# Patient Record
Sex: FEMALE | Race: WHITE | Employment: STUDENT | URBAN - METROPOLITAN AREA
[De-identification: names, ages, dates, MRNs, and addresses within clinical notes are randomized per-mention and may not be internally consistent; named-entity substitution may affect disease eponyms.]

---
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
## 2021-12-22 ENCOUNTER — HOSPITAL ENCOUNTER (EMERGENCY)
Age: 21
Discharge: HOME | End: 2021-12-22
Attending: EMERGENCY MEDICINE
Payer: COMMERCIAL

## 2021-12-22 VITALS
SYSTOLIC BLOOD PRESSURE: 116 MMHG | BODY MASS INDEX: 23.7 KG/M2 | HEIGHT: 69 IN | RESPIRATION RATE: 16 BRPM | WEIGHT: 160 LBS | DIASTOLIC BLOOD PRESSURE: 68 MMHG | OXYGEN SATURATION: 98 % | TEMPERATURE: 97.4 F | HEART RATE: 66 BPM

## 2021-12-22 DIAGNOSIS — H81.12 BENIGN PAROXYSMAL POSITIONAL VERTIGO OF LEFT EAR: Primary | ICD-10-CM

## 2021-12-22 LAB
ABSOLUTE EOS #: 0.1 K/UL (ref 0–0.4)
ABSOLUTE IMMATURE GRANULOCYTE: ABNORMAL K/UL (ref 0–0.3)
ABSOLUTE LYMPH #: 1.4 K/UL (ref 1.2–5.2)
ABSOLUTE MONO #: 0.5 K/UL (ref 0.1–1.4)
ANION GAP SERPL CALCULATED.3IONS-SCNC: 11 MMOL/L (ref 9–17)
BASOPHILS # BLD: 2 % (ref 0–2)
BASOPHILS ABSOLUTE: 0.1 K/UL (ref 0–0.2)
BUN BLDV-MCNC: 17 MG/DL (ref 6–20)
BUN/CREAT BLD: ABNORMAL (ref 9–20)
CALCIUM SERPL-MCNC: 9.3 MG/DL (ref 8.6–10.4)
CHLORIDE BLD-SCNC: 103 MMOL/L (ref 98–107)
CO2: 25 MMOL/L (ref 20–31)
CREAT SERPL-MCNC: 0.68 MG/DL (ref 0.5–0.9)
DIFFERENTIAL TYPE: ABNORMAL
EOSINOPHILS RELATIVE PERCENT: 1 % (ref 1–4)
GFR AFRICAN AMERICAN: >60 ML/MIN
GFR NON-AFRICAN AMERICAN: >60 ML/MIN
GFR SERPL CREATININE-BSD FRML MDRD: ABNORMAL ML/MIN/{1.73_M2}
GFR SERPL CREATININE-BSD FRML MDRD: ABNORMAL ML/MIN/{1.73_M2}
GLUCOSE BLD-MCNC: 104 MG/DL (ref 70–99)
HCG QUALITATIVE: NEGATIVE
HCT VFR BLD CALC: 39.2 % (ref 36–46)
HEMOGLOBIN: 13.3 G/DL (ref 12–16)
IMMATURE GRANULOCYTES: ABNORMAL %
LYMPHOCYTES # BLD: 22 % (ref 25–45)
MCH RBC QN AUTO: 29.7 PG (ref 26–34)
MCHC RBC AUTO-ENTMCNC: 33.9 G/DL (ref 31–37)
MCV RBC AUTO: 87.6 FL (ref 80–100)
MONOCYTES # BLD: 8 % (ref 2–8)
NRBC AUTOMATED: ABNORMAL PER 100 WBC
PDW BLD-RTO: 14 % (ref 12.5–15.4)
PLATELET # BLD: 229 K/UL (ref 140–450)
PLATELET ESTIMATE: ABNORMAL
PMV BLD AUTO: 9.5 FL (ref 6–12)
POTASSIUM SERPL-SCNC: 3.9 MMOL/L (ref 3.7–5.3)
RBC # BLD: 4.47 M/UL (ref 4–5.2)
RBC # BLD: ABNORMAL 10*6/UL
SEG NEUTROPHILS: 67 % (ref 34–64)
SEGMENTED NEUTROPHILS ABSOLUTE COUNT: 4.1 K/UL (ref 1.8–8)
SODIUM BLD-SCNC: 139 MMOL/L (ref 135–144)
WBC # BLD: 6.1 K/UL (ref 4.5–13.5)
WBC # BLD: ABNORMAL 10*3/UL

## 2021-12-22 PROCEDURE — 36415 COLL VENOUS BLD VENIPUNCTURE: CPT

## 2021-12-22 PROCEDURE — 96360 HYDRATION IV INFUSION INIT: CPT

## 2021-12-22 PROCEDURE — 84703 CHORIONIC GONADOTROPIN ASSAY: CPT

## 2021-12-22 PROCEDURE — 6370000000 HC RX 637 (ALT 250 FOR IP): Performed by: PHYSICIAN ASSISTANT

## 2021-12-22 PROCEDURE — 2580000003 HC RX 258: Performed by: PHYSICIAN ASSISTANT

## 2021-12-22 PROCEDURE — 85025 COMPLETE CBC W/AUTO DIFF WBC: CPT

## 2021-12-22 PROCEDURE — 99284 EMERGENCY DEPT VISIT MOD MDM: CPT

## 2021-12-22 PROCEDURE — 80048 BASIC METABOLIC PNL TOTAL CA: CPT

## 2021-12-22 RX ORDER — MECLIZINE HCL 12.5 MG/1
25 TABLET ORAL ONCE
Status: COMPLETED | OUTPATIENT
Start: 2021-12-22 | End: 2021-12-22

## 2021-12-22 RX ORDER — MECLIZINE HYDROCHLORIDE 25 MG/1
25 TABLET ORAL 3 TIMES DAILY PRN
Qty: 15 TABLET | Refills: 0 | Status: SHIPPED | OUTPATIENT
Start: 2021-12-22 | End: 2022-01-01

## 2021-12-22 RX ORDER — 0.9 % SODIUM CHLORIDE 0.9 %
1000 INTRAVENOUS SOLUTION INTRAVENOUS ONCE
Status: COMPLETED | OUTPATIENT
Start: 2021-12-22 | End: 2021-12-22

## 2021-12-22 RX ADMIN — MECLIZINE 25 MG: 12.5 TABLET ORAL at 11:47

## 2021-12-22 RX ADMIN — SODIUM CHLORIDE 1000 ML: 9 INJECTION, SOLUTION INTRAVENOUS at 11:47

## 2021-12-22 ASSESSMENT — ENCOUNTER SYMPTOMS
NAUSEA: 1
VOMITING: 1
EYE REDNESS: 0
SHORTNESS OF BREATH: 0
COUGH: 0
SINUS PRESSURE: 0
SINUS PAIN: 0
BACK PAIN: 0
EYE DISCHARGE: 0
PHOTOPHOBIA: 0
ABDOMINAL PAIN: 0

## 2021-12-22 NOTE — ED PROVIDER NOTES
Emergency Department           COMPLAINT       Chief Complaint   Patient presents with    Dizziness    Emesis      PHYSICAL EXAM      ED Triage Vitals [12/22/21 1115]   BP Temp Temp Source Pulse Resp SpO2 Height Weight   117/71 97.4 °F (36.3 °C) Oral 66 12 97 % 5' 9\" (1.753 m) 160 lb (72.6 kg)       Constitutional: Alert, oriented x3, nontoxic, answering questions appropriately, acting properly for age, in no acute distress   HEENT: Extraocular muscles intact, mucus membranes moist, TMs clear bilaterally, no posterior pharyngeal erythema or exudates, Pupils equal, round, reactive to light, no nystagmus no photophobia  Neck: Trachea midline no meningismus  Cardiovascular: Regular rhythm and rate no murmurs   Respiratory: Clear to auscultation bilaterally no wheezes, rhonchi, rales, no respiratory distress no tachypnea no retractions no hypoxia  Gastrointestinal: Soft, nontender, nondistended, positive bowel sounds. No rebound, rigidity, or guarding. Musculoskeletal: No extremity pain or swelling   Neurologic: Moving all 4 extremities without difficulty there are no gross focal neurologic deficits finger-to-nose normal.  Patellar tendons +1/4 bilaterally.   Skin: Warm and dry       Physical Exam  DIAGNOSTIC RESULTS     EKG: All EKG's are interpreted by the Emergency Department Physician who either signs or Co-signs this chart in the absence of a cardiologist.    Not indicated unless otherwise documented above or in the midlevel documentation    LABS:  Results for orders placed or performed during the hospital encounter of 12/22/21   CBC Auto Differential   Result Value Ref Range    WBC 6.1 4.5 - 13.5 k/uL    RBC 4.47 4.0 - 5.2 m/uL    Hemoglobin 13.3 12.0 - 16.0 g/dL    Hematocrit 39.2 36 - 46 %    MCV 87.6 80 - 100 fL    MCH 29.7 26 - 34 pg    MCHC 33.9 31 - 37 g/dL    RDW 14.0 12.5 - 15.4 %    Platelets 465 809 - 202 k/uL    MPV 9.5 6.0 - 12.0 fL    NRBC Automated NOT REPORTED per 100 WBC Differential Type NOT REPORTED     Seg Neutrophils 67 (H) 34 - 64 %    Lymphocytes 22 (L) 25 - 45 %    Monocytes 8 2 - 8 %    Eosinophils % 1 1 - 4 %    Basophils 2 0 - 2 %    Immature Granulocytes NOT REPORTED 0 %    Segs Absolute 4.10 1.8 - 8.0 k/uL    Absolute Lymph # 1.40 1.2 - 5.2 k/uL    Absolute Mono # 0.50 0.1 - 1.4 k/uL    Absolute Eos # 0.10 0.0 - 0.4 k/uL    Basophils Absolute 0.10 0.0 - 0.2 k/uL    Absolute Immature Granulocyte NOT REPORTED 0.00 - 0.30 k/uL    WBC Morphology NOT REPORTED     RBC Morphology NOT REPORTED     Platelet Estimate NOT REPORTED    Basic Metabolic Panel   Result Value Ref Range    Glucose 104 (H) 70 - 99 mg/dL    BUN 17 6 - 20 mg/dL    CREATININE 0.68 0.50 - 0.90 mg/dL    Bun/Cre Ratio NOT REPORTED 9 - 20    Calcium 9.3 8.6 - 10.4 mg/dL    Sodium 139 135 - 144 mmol/L    Potassium 3.9 3.7 - 5.3 mmol/L    Chloride 103 98 - 107 mmol/L    CO2 25 20 - 31 mmol/L    Anion Gap 11 9 - 17 mmol/L    GFR Non-African American >60 >60 mL/min    GFR African American >60 >60 mL/min    GFR Comment          GFR Staging NOT REPORTED    HCG Qualitative, Serum   Result Value Ref Range    hCG Qual NEGATIVE NEGATIVE       Not indicated unless otherwise documented above or in the midlevel documentation    RADIOLOGY:   I reviewedthe radiologist interpretations:  No orders to display       Not indicated unless otherwise documented above or in the midlevel documentation    EMERGENCY DEPARTMENT COURSE:       PERTINENT ATTENDING PHYSICIAN COMMENTS:    Vertigo. 2 episodes of the past several days with nausea. Today feels better but just worried that she is going to have another episode. No headache blurry vision or double vision. No gross focal neurologic deficits. IV fluids and Antivert. Check basic labs. 12:40 PM labs unremarkable. Feeling much better. Negative pregnancy. Suspect peripheral vertigo.     Faculty Attestation    I performed a history and physical examination of the patient and discussed management with the mid level provideer. I reviewed the mid level provider's note and agree with the documented findings and plan of care. Any areas of disagreement are noted on the chart. I was personally present for the key portions of any procedures. I have documented in the chart those procedures where I was not present during the key portions. I have reviewed the emergency nurses triage note. I agree with the chief complaint, past medical history, past surgical history, allergies, medications, social and family history as documented unless otherwise noted below. Documentation of the HPI, Physical Exam and Medical Decision Making performed by medical students or scribes is based on my personal performance of the HPI, PE and MDM. For Physician Assistant/ Nurse Practitioner cases/documentation I have personally evaluated this patient and have completed at least one if not all key elements of the E/M (history, physical exam, and MDM). Additional findings are as noted.        Tim Mccollum,   12/25/21 0030

## 2021-12-22 NOTE — ED NOTES
AVS reviewed with the patient. Discussed follow-up care, taking medications as prescribed, and when to call 911. VSS. All questions answered. IV Removed per order. Prescriptions sent to patient's pharmacy. Ambulatory out of the department with a steady, unassisted gait.         Raj Gutierrez RN  12/22/21 7554

## 2021-12-22 NOTE — ED PROVIDER NOTES
[address] [facility] ED  [address] [facility] [address]  Phone: [phone]  Fax: [phone]      Pt Name: Mercy Rene  MRN: [id number]  [place] 2000  Date of evaluation: 12/22/2021      CHIEF COMPLAINT       Chief Complaint   Patient presents with    Dizziness    Emesis       HISTORY OF PRESENT ILLNESS   (Location, Quality, Severity, Duration, Timing, Context, ModifyingFactors, Associated Signs and Symptoms)     Mercy Rene is a 22 y.o. female who presents to the ER for evaluation of dizziness. Patient states that she has noticed a small amount of dizziness over the past several days. She states that the dizziness was most significant yesterday. She ended up having 1 episode of vomiting around 7:30 PM.  Patient states that the dizziness was worse with turning her head quickly to the left. She has also noted that with walking she would veer to the left. Patient denies recent head injury. She has had no associated ear pain, sinus pressure or congestion. She has had no associated headaches. The patient's mother states that they did try to perform Epley maneuvers last night with no significant relief. Patient states that after sleeping she is feeling somewhat better today. She is not nauseous at the current time. Mother does report a history of anemia. Patient has not been able to have blood work performed due to [address]. She has not taken iron over the past year. Nursing Notes were reviewed. REVIEW OF SYSTEMS     (2-9 systems for level 4, 10 or more for level 5)    Review of Systems   Constitutional: Negative for chills and fever. HENT: Negative for congestion, ear discharge, ear pain, sinus pressure and sinus pain. Eyes: Negative for photophobia, discharge, redness and visual disturbance. Respiratory: Negative for cough and shortness of breath. Cardiovascular: Negative for chest pain. Gastrointestinal: Positive for nausea and vomiting. Negative for abdominal pain. Genitourinary: Negative for dysuria. Musculoskeletal: Negative for back pain and neck pain. Skin: Negative for rash. Neurological: Positive for dizziness. Negative for seizures, syncope and headaches. All other systems reviewed and are negative. PAST MEDICAL HISTORY    has a past medical history of Anemia. SURGICAL HISTORY      has no past surgical history on file. CURRENT MEDICATIONS       Previous Medications    FEROSUL 325 (65 FE) MG TABLET    take 1 tablet by mouth once daily WITH BREAKFAST    SERTRALINE (ZOLOFT) 50 MG TABLET    Take 1 tablet by mouth daily     ALLERGIES     is allergic to no known allergies. FAMILY HISTORY     Father - [facility]    SOCIAL HISTORY      reports that she has never smoked. She has never used smokeless tobacco. She reports that she does not drink alcohol and does not use drugs. PHYSICAL EXAM     (7 for level 4, 8 or more for level 5)    ED Triage Vitals [12/22/21 1115]   BP Temp Temp Source Pulse Resp SpO2 Height Weight   117/71 97.4 °F (36.3 °C) Oral 66 12 97 % 5' 9\" (1.753 m) 160 lb (72.6 kg)     Physical Exam  Vitals reviewed. Constitutional:       General: She is not in acute distress. Appearance: Normal appearance. She is normal weight. She is not toxic-appearing. HENT:      Head: Normocephalic and atraumatic. Right Ear: Tympanic membrane and ear canal normal.      Left Ear: Tympanic membrane and ear canal normal.      Nose: No congestion or rhinorrhea. Mouth/Throat:      Mouth: Mucous membranes are moist.      Pharynx: No oropharyngeal exudate or posterior oropharyngeal erythema. Eyes:      General: No scleral icterus. Cardiovascular:      Rate and Rhythm: Normal rate and regular rhythm. Heart sounds: Normal heart sounds. Pulmonary:      Effort: Pulmonary effort is normal. No respiratory distress. Breath sounds: Normal breath sounds.    Abdominal:      General: Bowel sounds are normal. There is no distension. Palpations: Abdomen is soft. Musculoskeletal:         General: No swelling, tenderness or deformity. Skin:     General: Skin is warm and dry. Findings: No rash. Neurological:      General: No focal deficit present. Mental Status: She is alert and oriented to person, place, and time.    Psychiatric:         Mood and Affect: Mood normal.         Behavior: Behavior normal.       DIAGNOSTIC RESULTS     LABS:  Results for orders placed or performed during the hospital encounter of 12/22/21   CBC Auto Differential   Result Value Ref Range    WBC 6.1 4.5 - 13.5 k/uL    RBC 4.47 4.0 - 5.2 m/uL    Hemoglobin 13.3 12.0 - 16.0 g/dL    Hematocrit 39.2 36 - 46 %    MCV 87.6 80 - 100 fL    MCH 29.7 26 - 34 pg    MCHC 33.9 31 - 37 g/dL    RDW 14.0 12.5 - 15.4 %    Platelets 699 499 - 021 k/uL    MPV 9.5 6.0 - 12.0 fL    NRBC Automated NOT REPORTED per 100 WBC    Differential Type NOT REPORTED     Seg Neutrophils 67 (H) 34 - 64 %    Lymphocytes 22 (L) 25 - 45 %    Monocytes 8 2 - 8 %    Eosinophils % 1 1 - 4 %    Basophils 2 0 - 2 %    Immature Granulocytes NOT REPORTED 0 %    Segs Absolute 4.10 1.8 - 8.0 k/uL    Absolute Lymph # 1.40 1.2 - 5.2 k/uL    Absolute Mono # 0.50 0.1 - 1.4 k/uL    Absolute Eos # 0.10 0.0 - 0.4 k/uL    Basophils Absolute 0.10 0.0 - 0.2 k/uL    Absolute Immature Granulocyte NOT REPORTED 0.00 - 0.30 k/uL    WBC Morphology NOT REPORTED     RBC Morphology NOT REPORTED     Platelet Estimate NOT REPORTED    Basic Metabolic Panel   Result Value Ref Range    Glucose 104 (H) 70 - 99 mg/dL    BUN 17 6 - 20 mg/dL    CREATININE 0.68 0.50 - 0.90 mg/dL    Bun/Cre Ratio NOT REPORTED 9 - 20    Calcium 9.3 8.6 - 10.4 mg/dL    Sodium 139 135 - 144 mmol/L    Potassium 3.9 3.7 - 5.3 mmol/L    Chloride 103 98 - 107 mmol/L    CO2 25 20 - 31 mmol/L    Anion Gap 11 9 - 17 mmol/L    GFR Non-African American >60 >60 mL/min    GFR African American >60 >60 mL/min    GFR Comment          GFR Staging NOT REPORTED    HCG Qualitative, Serum   Result Value Ref Range    hCG Qual NEGATIVE NEGATIVE     MDM:   Patient presents to the ER for evaluation of dizziness. This has been steadily worsening over the past few days. Patient has noticed that with walking she veers to the left. She has increased dizziness with turning her head quickly to the left. No prior history of vertigo. No recent head trauma. Patient has had no associated ear pain, nasal congestion or headaches. Mother is also concerned for the patient's hemoglobin. She does have a history of anemia. I will check basic labs and treat the patient with IV fluids and Antivert. EMERGENCY DEPARTMENT COURSE:   Vitals:    Vitals:    12/22/21 1115   BP: 117/71   Pulse: 66   Resp: 12   Temp: 97.4 °F (36.3 °C)   TempSrc: Oral   SpO2: 97%   Weight: 72.6 kg (160 lb)   Height: 5' 9\" (1.753 m)     -------------------------  BP: 117/71, Temp: 97.4 °F (36.3 °C), Pulse: 66, Resp: 12    The patient was given the following medications:  Orders Placed This Encounter   Medications    meclizine (ANTIVERT) tablet 25 mg    0.9 % sodium chloride bolus    meclizine (ANTIVERT) 25 MG tablet     Sig: Take 1 tablet by mouth 3 times daily as needed for Dizziness or Nausea     Dispense:  15 tablet     Refill:  0      Re-evaluation Notes  12:22 PM.  Results of the labs were discussed with the patient and her mother by myself. Patient's labs are unremarkable. Patient is feeling better after IV fluids and Antivert. I will provide her with specific exercises to perform at home to help with dizziness. I will also provide her with a quantity of Antivert to take as directed for dizziness. Follow-up evaluation with primary care doctor in the next 2 to 3 days. FINAL IMPRESSION      1.  Benign paroxysmal positional vertigo of left ear        DISPOSITION/PLAN   DISPOSITION      Condition on Disposition  Stable    PATIENT REFERRED TO:  Tony Yanez MD  [address]  [address] [address] [place] [address] Drive  [phone]    Schedule an appointment as soon as possible for a visit   For reevaluation in 2-3 days    DISCHARGE MEDICATIONS:  New Prescriptions    MECLIZINE (ANTIVERT) 25 MG TABLET    Take 1 tablet by mouth 3 times daily as needed for Dizziness or Nausea     (Please note that portions of this note were completed with a voice recognition program.  Efforts were made to edit the dictations but occasionally words are mis-transcribed.)    Gilda Larios PA-C  12/22/21 9522

## 2022-09-14 ENCOUNTER — HOSPITAL ENCOUNTER (EMERGENCY)
Age: 22
Discharge: HOME | End: 2022-09-14
Attending: EMERGENCY MEDICINE
Payer: COMMERCIAL

## 2022-09-14 ENCOUNTER — APPOINTMENT (OUTPATIENT)
Dept: GENERAL RADIOLOGY | Age: 22
End: 2022-09-14
Payer: COMMERCIAL

## 2022-09-14 VITALS
OXYGEN SATURATION: 99 % | TEMPERATURE: 98.6 F | SYSTOLIC BLOOD PRESSURE: 135 MMHG | RESPIRATION RATE: 16 BRPM | WEIGHT: 158 LBS | BODY MASS INDEX: 23.4 KG/M2 | DIASTOLIC BLOOD PRESSURE: 87 MMHG | HEART RATE: 70 BPM | HEIGHT: 69 IN

## 2022-09-14 DIAGNOSIS — S60.212A CONTUSION OF LEFT WRIST, INITIAL ENCOUNTER: ICD-10-CM
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
## 2022-09-14 DIAGNOSIS — S20.219A CONTUSION OF CHEST WALL, UNSPECIFIED LATERALITY, INITIAL ENCOUNTER: ICD-10-CM

## 2022-09-14 DIAGNOSIS — T23.172A SUPERFICIAL BURN OF LEFT WRIST, INITIAL ENCOUNTER: ICD-10-CM

## 2022-09-14 PROCEDURE — 99284 EMERGENCY DEPT VISIT MOD MDM: CPT

## 2022-09-14 PROCEDURE — 93005 ELECTROCARDIOGRAM TRACING: CPT | Performed by: EMERGENCY MEDICINE

## 2022-09-14 PROCEDURE — 6370000000 HC RX 637 (ALT 250 FOR IP): Performed by: EMERGENCY MEDICINE

## 2022-09-14 PROCEDURE — 73110 X-RAY EXAM OF WRIST: CPT

## 2022-09-14 PROCEDURE — 71046 X-RAY EXAM CHEST 2 VIEWS: CPT

## 2022-09-14 RX ORDER — IBUPROFEN 800 MG/1
800 TABLET ORAL ONCE
Status: DISCONTINUED | OUTPATIENT
Start: 2022-09-14 | End: 2022-09-14 | Stop reason: HOSPADM

## 2022-09-14 RX ORDER — ACETAMINOPHEN 325 MG/1
650 TABLET ORAL ONCE
Status: COMPLETED | OUTPATIENT
Start: 2022-09-14 | End: 2022-09-14

## 2022-09-14 RX ADMIN — ACETAMINOPHEN 650 MG: 325 TABLET ORAL at 21:26

## 2022-09-14 ASSESSMENT — PAIN DESCRIPTION - LOCATION
LOCATION_3: CHEST
LOCATION_2: HEAD

## 2022-09-14 ASSESSMENT — ENCOUNTER SYMPTOMS
BACK PAIN: 0
NAUSEA: 0
SORE THROAT: 0
RHINORRHEA: 0
ABDOMINAL PAIN: 0
DIARRHEA: 0
SHORTNESS OF BREATH: 0
VOMITING: 0
COUGH: 0
EYE PAIN: 0

## 2022-09-14 ASSESSMENT — PAIN DESCRIPTION - DESCRIPTORS
DESCRIPTORS_2: ACHING
DESCRIPTORS: BURNING
DESCRIPTORS_3: DISCOMFORT

## 2022-09-14 ASSESSMENT — PAIN SCALES - GENERAL: PAINLEVEL_OUTOF10: 6

## 2022-09-14 ASSESSMENT — PAIN DESCRIPTION - FREQUENCY: FREQUENCY: CONTINUOUS

## 2022-09-14 ASSESSMENT — PAIN DESCRIPTION - INTENSITY
RATING_2: 4
RATING_3: 3

## 2022-09-14 ASSESSMENT — PAIN DESCRIPTION - PAIN TYPE: TYPE: ACUTE PAIN

## 2022-09-14 ASSESSMENT — PAIN DESCRIPTION - ORIENTATION: ORIENTATION: LEFT

## 2022-09-15 LAB
EKG ATRIAL RATE: 65 BPM
EKG P AXIS: 44 DEGREES
EKG P-R INTERVAL: 158 MS
EKG Q-T INTERVAL: 428 MS
EKG QRS DURATION: 90 MS
EKG QTC CALCULATION (BAZETT): 445 MS
EKG R AXIS: 62 DEGREES
EKG T AXIS: 33 DEGREES
EKG VENTRICULAR RATE: 65 BPM
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
## 2022-09-15 NOTE — DISCHARGE INSTRUCTIONS
PLEASE RETURN TO THE EMERGENCY DEPARTMENT IMMEDIATELY if your symptoms worsen in anyway or in 8-12 hours if not improved for re-evaluation. You should immediately return to the ER for symptoms such as increasing pain, shortness of breath, fever, a feeling of passing out, light headed, dizziness, abdominal pain, numbness or weakness to the arms or legs, coolness or color change of the arms or legs. Take your medication as indicated and prescribed. If you are given an antibiotic then, make sure you get the prescription filled and take the antibiotics until finished. Please understand that at this time there is no evidence for a more serious underlying process, but that early in the process of an illness or injury, an emergency department workup can be falsely reassuring. You should contact your family doctor within the next 24 hours for a follow up appointment    Ignacia Herrera!!!    From [facility] ([facility]) and [address] [place] [address] Emergency Services    On behalf of the Emergency Department staff at [facility]), I would like to thank you for giving us the opportunity to address your health care needs and concerns. We hope that during your visit, our service was delivered in a professional and caring manner. Please keep [facility] ([facility]) in mind as we walk with you down the path to your own personal wellness. Please expect an automated text message or email from us so we can ask a few questions about your health and progress. Based on your answers, a clinician may call you back to offer help and instructions. Please understand that early in the process of an illness or injury, an emergency department workup can be falsely reassuring. If you notice any worsening, changing or persistent symptoms please call your family doctor or return to the ER immediately. Tell us how we did during your visit at [email or web address]   and let us know about your experience

## 2023-10-16 ENCOUNTER — HOSPITAL ENCOUNTER (OUTPATIENT)
Age: 23
Setting detail: SPECIMEN
Discharge: HOME | End: 2023-10-16

## 2023-10-16 ENCOUNTER — OFFICE VISIT (OUTPATIENT)
Dept: PRIMARY CARE CLINIC | Age: 23
End: 2023-10-16

## 2023-10-16 VITALS
OXYGEN SATURATION: 98 % | WEIGHT: 158 LBS | BODY MASS INDEX: 23.33 KG/M2 | TEMPERATURE: 98.7 F | HEART RATE: 78 BPM | DIASTOLIC BLOOD PRESSURE: 64 MMHG | SYSTOLIC BLOOD PRESSURE: 118 MMHG

## 2023-10-16 DIAGNOSIS — Z33.1 IUP (INTRAUTERINE PREGNANCY), INCIDENTAL: ICD-10-CM

## 2023-10-16 DIAGNOSIS — R35.0 URINARY FREQUENCY: Primary | ICD-10-CM

## 2023-10-16 DIAGNOSIS — R35.0 URINARY FREQUENCY: ICD-10-CM

## 2023-10-16 DIAGNOSIS — R50.9 FEVER, UNSPECIFIED FEVER CAUSE: ICD-10-CM

## 2023-10-16 LAB
ALBUMIN SERPL-MCNC: 4.2 G/DL (ref 3.5–5.2)
ALBUMIN/GLOB SERPL: 1.3 {RATIO} (ref 1–2.5)
ALP SERPL-CCNC: 53 U/L (ref 35–104)
ALT SERPL-CCNC: 14 U/L (ref 5–33)
ANION GAP SERPL CALCULATED.3IONS-SCNC: 10 MMOL/L (ref 9–17)
AST SERPL-CCNC: 17 U/L
BASOPHILS # BLD: 0 K/UL (ref 0–0.2)
BASOPHILS NFR BLD: 0 % (ref 0–2)
BILIRUB SERPL-MCNC: 0.5 MG/DL (ref 0.3–1.2)
BILIRUBIN, POC: ABNORMAL
BLOOD URINE, POC: ABNORMAL
BUN SERPL-MCNC: 8 MG/DL (ref 6–20)
CALCIUM SERPL-MCNC: 9.3 MG/DL (ref 8.6–10.4)
CHLORIDE SERPL-SCNC: 99 MMOL/L (ref 98–107)
CLARITY, POC: CLEAR
CO2 SERPL-SCNC: 25 MMOL/L (ref 20–31)
COLOR, POC: YELLOW
CREAT SERPL-MCNC: 0.4 MG/DL (ref 0.5–0.9)
EOSINOPHIL # BLD: 0 K/UL (ref 0–0.4)
EOSINOPHILS RELATIVE PERCENT: 0 % (ref 1–4)
ERYTHROCYTE [DISTWIDTH] IN BLOOD BY AUTOMATED COUNT: 12.7 % (ref 11.8–14.4)
GFR SERPL CREATININE-BSD FRML MDRD: >60 ML/MIN/1.73M2
GLUCOSE SERPL-MCNC: 85 MG/DL (ref 70–99)
GLUCOSE URINE, POC: ABNORMAL
HCT VFR BLD AUTO: 37.3 % (ref 36.3–47.1)
HGB BLD-MCNC: 12.3 G/DL (ref 11.9–15.1)
IMM GRANULOCYTES # BLD AUTO: 0 K/UL (ref 0–0.3)
IMM GRANULOCYTES NFR BLD: 0 %
KETONES, POC: ABNORMAL
LEUKOCYTE EST, POC: ABNORMAL
LYMPHOCYTES NFR BLD: 0.48 K/UL (ref 1–4.8)
LYMPHOCYTES RELATIVE PERCENT: 6 % (ref 24–44)
MCH RBC QN AUTO: 28.3 PG (ref 25.2–33.5)
MCHC RBC AUTO-ENTMCNC: 33 G/DL (ref 28.4–34.8)
MCV RBC AUTO: 85.7 FL (ref 82.6–102.9)
MONOCYTES NFR BLD: 0.72 K/UL (ref 0.1–0.8)
MONOCYTES NFR BLD: 9 % (ref 1–7)
MORPHOLOGY: NORMAL
NEUTROPHILS NFR BLD: 85 % (ref 36–66)
NEUTS SEG NFR BLD: 6.8 K/UL (ref 1.8–7.7)
NITRITE, POC: ABNORMAL
NRBC BLD-RTO: 0 PER 100 WBC
PH, POC: 7
PLATELET # BLD AUTO: 229 K/UL (ref 138–453)
PMV BLD AUTO: 10.8 FL (ref 8.1–13.5)
POTASSIUM SERPL-SCNC: 4 MMOL/L (ref 3.7–5.3)
PROCALCITONIN SERPL-MCNC: 0.79 NG/ML
PROT SERPL-MCNC: 7.4 G/DL (ref 6.4–8.3)
PROTEIN, POC: ABNORMAL
RBC # BLD AUTO: 4.35 M/UL (ref 3.95–5.11)
SODIUM SERPL-SCNC: 134 MMOL/L (ref 135–144)
SPECIFIC GRAVITY, POC: 1.02
UROBILINOGEN, POC: 0.2
WBC OTHER # BLD: 8 K/UL (ref 3.5–11.3)

## 2023-10-16 RX ORDER — CEPHALEXIN 500 MG/1
500 CAPSULE ORAL 3 TIMES DAILY
Qty: 30 CAPSULE | Refills: 0 | Status: SHIPPED | OUTPATIENT
Start: 2023-10-16 | End: 2023-10-26

## 2023-10-17 ENCOUNTER — TELEPHONE (OUTPATIENT)
Dept: PRIMARY CARE CLINIC | Age: 23
End: 2023-10-17

## 2023-10-17 LAB
MICROORGANISM SPEC CULT: NORMAL
SPECIMEN DESCRIPTION: NORMAL

## 2023-10-17 NOTE — TELEPHONE ENCOUNTER
Patients mom stopped by the clinic today to get copies of this patiens labs and office note. Apparently in the Er - they did no evaluation and they claimed they could not see the labs. .. Patient still does not feel well.     Advised mom to get a hold of the ob today- this patient needs further eval.

## 2023-10-18 ENCOUNTER — OFFICE VISIT (OUTPATIENT)
Dept: PRIMARY CARE CLINIC | Age: 23
End: 2023-10-18

## 2023-10-18 ENCOUNTER — HOSPITAL ENCOUNTER (OUTPATIENT)
Age: 23
Setting detail: SPECIMEN
Discharge: HOME | End: 2023-10-18

## 2023-10-18 VITALS
SYSTOLIC BLOOD PRESSURE: 128 MMHG | TEMPERATURE: 100.4 F | OXYGEN SATURATION: 100 % | HEART RATE: 88 BPM | DIASTOLIC BLOOD PRESSURE: 64 MMHG

## 2023-10-18 DIAGNOSIS — R50.9 FEVER, UNSPECIFIED FEVER CAUSE: ICD-10-CM

## 2023-10-18 DIAGNOSIS — Z33.1 IUP (INTRAUTERINE PREGNANCY), INCIDENTAL: ICD-10-CM

## 2023-10-18 DIAGNOSIS — R50.9 FEVER, UNSPECIFIED FEVER CAUSE: Primary | ICD-10-CM

## 2023-10-18 LAB
BASOPHILS # BLD: 0.03 K/UL (ref 0–0.2)
BASOPHILS NFR BLD: 0 % (ref 0–2)
EOSINOPHIL # BLD: <0.03 K/UL (ref 0–0.44)
EOSINOPHILS RELATIVE PERCENT: 0 % (ref 1–4)
ERYTHROCYTE [DISTWIDTH] IN BLOOD BY AUTOMATED COUNT: 12.7 % (ref 11.8–14.4)
HCT VFR BLD AUTO: 35.3 % (ref 36.3–47.1)
HGB BLD-MCNC: 11.8 G/DL (ref 11.9–15.1)
IMM GRANULOCYTES # BLD AUTO: 0.08 K/UL (ref 0–0.3)
IMM GRANULOCYTES NFR BLD: 1 %
INFLUENZA A ANTIBODY: NEGATIVE
INFLUENZA B ANTIBODY: NEGATIVE
LYMPHOCYTES NFR BLD: 1.11 K/UL (ref 1.1–3.7)
LYMPHOCYTES RELATIVE PERCENT: 9 % (ref 24–43)
MCH RBC QN AUTO: 28.6 PG (ref 25.2–33.5)
MCHC RBC AUTO-ENTMCNC: 33.4 G/DL (ref 28.4–34.8)
MCV RBC AUTO: 85.5 FL (ref 82.6–102.9)
MONOCYTES NFR BLD: 1.04 K/UL (ref 0.1–1.2)
MONOCYTES NFR BLD: 8 % (ref 3–12)
NEUTROPHILS NFR BLD: 82 % (ref 36–65)
NEUTS SEG NFR BLD: 10.43 K/UL (ref 1.5–8.1)
NRBC BLD-RTO: 0 PER 100 WBC
PLATELET # BLD AUTO: 224 K/UL (ref 138–453)
PMV BLD AUTO: 10.7 FL (ref 8.1–13.5)
PROCALCITONIN SERPL-MCNC: 0.55 NG/ML
RBC # BLD AUTO: 4.13 M/UL (ref 3.95–5.11)
WBC OTHER # BLD: 12.7 K/UL (ref 3.5–11.3)

## 2023-10-18 NOTE — PROGRESS NOTES
get written instructions at discharge on how to activate this module. COVID-19     This goes to [facility]. Saline solution     Standing Status:   Future     Standing Expiration Date:   10/18/2024     Scheduling Instructions:      1) Due to current limited availability of the COVID-19 test, tests will be prioritized based on responses to questions above. Testing may be delayed due to volume. 2) Print and instruct patient to adhere to CDC home isolation program. (Link Above)              3) Set up or refer patient for a monitoring program.              4) Have patient sign up for and leverage MyChart (if not previously done). Order Specific Question:   Is this test for diagnosis or screening? Answer:   Diagnosis of ill patient     Order Specific Question:   Symptomatic for COVID-19 as defined by CDC? Answer:   Yes     Order Specific Question:   Date of Symptom Onset     Answer:   10/14/2023     Order Specific Question:   Hospitalized for COVID-19? Answer:   No     Order Specific Question:   Admitted to ICU for COVID-19? Answer:   No     Order Specific Question:   Employed in healthcare setting? Answer:   No     Order Specific Question:   Resident in a congregate (group) care setting? Answer:   No     Order Specific Question:   Pregnant? Answer:   Yes     Order Specific Question:   Previously tested for COVID-19? Answer:   Yes    CBC with Auto Differential     Standing Status:   Future     Number of Occurrences:   1     Standing Expiration Date:   10/18/2024    Procalcitonin     Standing Status:   Future     Number of Occurrences:   1     Standing Expiration Date:   10/18/2024    POCT Influenza A/B     Return in about 2 weeks (around 11/1/2023). There are no Patient Instructions on file for this visit.   Follow up with your provider

## 2023-10-19 LAB
SARS-COV-2 RNA RESP QL NAA+PROBE: NORMAL
SARS-COV-2 RNA RESP QL NAA+PROBE: NOT DETECTED
SOURCE: NORMAL

## 2023-10-20 ENCOUNTER — TELEPHONE (OUTPATIENT)
Dept: PRIMARY CARE CLINIC | Age: 23
End: 2023-10-20

## 2023-10-20 NOTE — TELEPHONE ENCOUNTER
Mom called stating that Tyshawn Mcneal is still having back pain. She started the Keflex yesterday. She had a low grade fever yesterday and had to take 2 tylenol instead of one. Mom states that she is concerned and wanted her to be seen by Dr Jewell Ordaz again. Writer informed mom that he is out of town and she needs to call family physician and [id number], if fever continues and or pain or sx get worse she needs to go to the ER. Mom verbalized her understanding.